# Patient Record
(demographics unavailable — no encounter records)

---

## 2018-03-21 RX ORDER — ATORVASTATIN CALCIUM 10 MG/1
TABLET, FILM COATED ORAL
Qty: 30 TABLET | Refills: 2 | Status: SHIPPED | OUTPATIENT
Start: 2018-03-21

## 2018-03-22 NOTE — TELEPHONE ENCOUNTER
Call tel;l pt if on statin nweeds lab q 6 mop No lab in computer OK 3 mo refills give time come in get lab be seen get in computer get lab results, get refills Needs CBC,CMP,lipid , TSH if DM add hgb A1C if hypothyroid add T4,TSh